# Patient Record
(demographics unavailable — no encounter records)

---

## 2018-05-08 NOTE — EKG
Test Reason : 

Blood Pressure : ***/*** mmHG

Vent. Rate : 085 BPM     Atrial Rate : 085 BPM

   P-R Int : 158 ms          QRS Dur : 094 ms

    QT Int : 348 ms       P-R-T Axes : 049 008 001 degrees

   QTc Int : 414 ms

 

*** Poor data quality, interpretation may be adversely affected

Normal sinus rhythm

T wave abnormality, consider inferior ischemia

Abnormal ECG

No previous ECGs available

Confirmed by DR. KARL PATE ([id number]) on 5/8/2018 2:32:17 PM

 

Referred By:  ANGELINA           Confirmed By:DR. KARL PATE